# Patient Record
Sex: MALE | Race: WHITE
[De-identification: names, ages, dates, MRNs, and addresses within clinical notes are randomized per-mention and may not be internally consistent; named-entity substitution may affect disease eponyms.]

---

## 2020-08-07 ENCOUNTER — HOSPITAL ENCOUNTER (EMERGENCY)
Dept: HOSPITAL 39 - ER | Age: 30
Discharge: HOME | End: 2020-08-07
Payer: SELF-PAY

## 2020-08-07 VITALS — DIASTOLIC BLOOD PRESSURE: 76 MMHG | TEMPERATURE: 98.7 F | SYSTOLIC BLOOD PRESSURE: 112 MMHG | OXYGEN SATURATION: 97 %

## 2020-08-07 DIAGNOSIS — J20.8: Primary | ICD-10-CM

## 2020-08-07 DIAGNOSIS — Z20.828: ICD-10-CM

## 2020-08-07 NOTE — RAD
EXAM DESCRIPTION: Chest,1 View



CLINICAL HISTORY: 29 years Male, Cough, SOB, body aches



COMPARISON: None.



FINDINGS: One view/radiograph



Heart size and pulmonary vessels are within normal limits. 



There is no pneumothorax or pleural effusion. The lungs are clear

bilaterally. 



The soft tissues are unremarkable. No acute osseous findings.



IMPRESSION: 



No acute cardiopulmonary abnormality. 



Electronically signed by:  Tod García MD  8/7/2020 1:17 PM

CDT Workstation: [id number]

## 2020-08-07 NOTE — ED.PDOC
History of Present Illness





- General


Chief Complaint: Respiratory Problem


Stated Complaint: SOB, body aches, sore throat


Time Seen by Provider: 08/07/20 12:59


Source: patient





- History of Present Illness


Comments: 





This is a 29-year-old male with no significant past medical history presented to

emergency department with cough, sore throat, body aches and "flu like symptoms"

that began this morning.  He denies any fever, but does report associated 

chills.  He has a known COVID contact with his girlfriend's brother 

approximately 2 weeks ago, no symptoms until today.  He denies any other known 

sick contacts.  No recent travel.


Timing/Duration: this morning


Cough Quality/Degree: no cough


Possible Cause: illness exposure


Improving Factors: nothing


Worsening Factors: nothing


Associated Symptoms: cough, fever/chills, muscle aches, sore throat


Respiratory Risk Factors: exposure to illness


Allergies/Adverse Reactions: 


Allergies





NO KNOWN ALLERGY Allergy (Verified 08/07/20 12:58)


   





Home Medications: 


Ambulatory Orders





Albuterol Inhaler [Ventolin Hfa Inhaler] 108 mcg INH Q5H #1 inh 08/07/20 


Ibuprofen [Motrin] 400 mg PO Q6H PRN #20 tab 08/07/20 


Ondansetron Odt [Zofran ODT] 4 - 8 mg PO Q6H PRN #15 tab 08/07/20 


guaiFENesin W/CODEINE LIQ [Robitussin AC] 10 ml PO Q6H PRN #120 ml 08/07/20 











Review of Systems





- Review of Systems


Constitutional: States: chills.  Denies: fever


EENTM: States: nose congestion, throat pain.  Denies: ear pain, nose pain, mouth

pain


Respiratory: States: cough, short of breath.  Denies: orthopnea, stridor


Cardiology: Denies: chest pain, edema


Gastrointestinal/Abdominal: Denies: abdominal pain, diarrhea, nausea, vomiting


Genitourinary: Denies: dysuria, hematuria


Musculoskeletal: Denies: joint pain, joint swelling, muscle stiffness, neck pain


Skin: Denies: lesions, rash


Neurological: Denies: headache, paresthesia, weakness


Endocrine: States: no symptoms reported


Hematologic/Lymphatic: States: no symptoms reported





Past Medical History (General)





- Patient Medical History


Hx Stroke: No


Hx of COPD: No


Hx Cardiac Disorders: No


Hx Hypertension: No


Hx Thyroid Disease: No


Hx Diabetes: No


Hx Cancer: No


Surgical History: no surgical history





- Vaccination History


Hx Tetanus, Diphtheria Vaccination: No


Hx Influenza Vaccination: No


Hx Pneumococcal Vaccination: No





- Social History


Hx Tobacco Use: Yes


Hx Alcohol Use: No


Hx Substance Use: No


Hx Substance Use Treatment: No


Hx Depression: No





- Female History


Patient is a Female of Child Bearing Age (10 -59 yrs old): No


Patient Pregnant: No





Family Medical History





- Family History


  ** Mother


Family History: No Known


Living Status: Still Living





Physical Exam





- Physical Exam


General Appearance: Alert, Comfortable


ENT Exam: normal ENT inspection, hearing grossly normal


Neck: full range of motion, supple


Respiratory: lungs clear, normal breath sounds, no respiratory distress, no 

accessory muscle use


Cardiovascular/Chest: normal peripheral pulses, regular rate, rhythm, no edema, 

no gallop, no JVD, no murmur


Gastrointestinal/Abdominal: non tender, soft


Extremity: non-tender, normal inspection, no pedal edema


Neurologic: no motor/sensory deficits, alert, normal mood/affect, oriented x 3


Skin Exam: normal color, warm/dry





Progress





- Progress


Progress: 





08/07/20 13:41


Rechecked.  Discussed x-ray results.  Explained concerned about possible COVID 

and need to quarantine until tests are resulted.  Patient elected not to stay in

the emergency department and till test was resulted, will call back in 2 to 3 

hours for results.  Discussed return to work warnings.  Strict ER warnings given

to return for worsening.





DDX:COVID-19 versus other viral URI, pneumonia





MDM:


Flulike symptoms, known history of COVID-19 contact.  O2 sats normal, tolerating

p.o., no altered mental status at this time.  His chest x-ray is clear.  COVID-

19 test is pending.  No indication for admission or extensive work-up at this 

time.  Strict warnings given to return the emergency room for worsening symptoms








Alexis Hull DO


[facility] [id number]





- Results/Orders


Results/Orders: 





Chest x-ray reviewed personally by me at 1:10 PM.  No acute process, no 

infiltrates, no pneumothorax








EXAM DESCRIPTION: Chest,1 View  


CLINICAL HISTORY: 29 years Male, Cough, SOB, body aches  


COMPARISON: None.  





FINDINGS: One view/radiograph  Heart size and pulmonary vessels are within 

normal limits.  There is no pneumothorax or pleural effusion. The lungs are 

clear bilaterally.  The soft tissues are unremarkable. No acute osseous 

findings.  





IMPRESSION:  No acute cardiopulmonary abnormality.  


Electronically signed by: Tod García MD 8/7/2020 1:17 PM





Departure





- Departure


Clinical Impression: 


 Acute viral bronchitis, Exposure to COVID-19 virus





Disposition: Discharge to Home or Self Care


Condition: Good


Departure Forms:  ED Discharge - Pt. Copy, ED Discharge - Work Release, Patient 

Portal Self Enrollment


Diet: resume usual diet


Activity: increase activity as tolerated


Prescriptions: 


Ibuprofen [Motrin] 400 mg PO Q6H PRN #20 tab


 PRN Reason: Pain


guaiFENesin W/CODEINE LIQ [Robitussin AC] 10 ml PO Q6H PRN #120 ml


 PRN Reason: Cough


Albuterol Inhaler [Ventolin Hfa Inhaler] 108 mcg INH Q5H #1 inh


Ondansetron Odt [Zofran ODT] 4 - 8 mg PO Q6H PRN #15 tab


 PRN Reason: Nausea


Home Medications: 


Ambulatory Orders





Albuterol Inhaler [Ventolin Hfa Inhaler] 108 mcg INH Q5H #1 inh 08/07/20 


Ibuprofen [Motrin] 400 mg PO Q6H PRN #20 tab 08/07/20 


Ondansetron Odt [Zofran ODT] 4 - 8 mg PO Q6H PRN #15 tab 08/07/20 


guaiFENesin W/CODEINE LIQ [Robitussin AC] 10 ml PO Q6H PRN #120 ml 08/07/20 








Additional Instructions: 


Return to the emergency room immediately for worsening shortness of breath, 

intractable vomiting, changes in mental status, or any other concerns